# Patient Record
Sex: MALE | Race: WHITE | NOT HISPANIC OR LATINO | URBAN - METROPOLITAN AREA
[De-identification: names, ages, dates, MRNs, and addresses within clinical notes are randomized per-mention and may not be internally consistent; named-entity substitution may affect disease eponyms.]

---

## 2018-02-04 ENCOUNTER — EMERGENCY (EMERGENCY)
Facility: HOSPITAL | Age: 43
LOS: 1 days | Discharge: HOME | End: 2018-02-04
Attending: EMERGENCY MEDICINE | Admitting: EMERGENCY MEDICINE
Payer: COMMERCIAL

## 2018-02-04 VITALS
DIASTOLIC BLOOD PRESSURE: 90 MMHG | TEMPERATURE: 98 F | SYSTOLIC BLOOD PRESSURE: 130 MMHG | RESPIRATION RATE: 16 BRPM | HEART RATE: 88 BPM | OXYGEN SATURATION: 100 %

## 2018-02-04 DIAGNOSIS — N50.812 LEFT TESTICULAR PAIN: ICD-10-CM

## 2018-02-04 PROCEDURE — 76870 US EXAM SCROTUM: CPT | Mod: 26

## 2018-02-04 PROCEDURE — 99284 EMERGENCY DEPT VISIT MOD MDM: CPT

## 2018-02-04 RX ORDER — IBUPROFEN 200 MG
600 TABLET ORAL ONCE
Qty: 0 | Refills: 0 | Status: COMPLETED | OUTPATIENT
Start: 2018-02-04 | End: 2018-02-04

## 2018-02-04 RX ORDER — AZITHROMYCIN 500 MG/1
4 TABLET, FILM COATED ORAL
Qty: 4 | Refills: 0 | OUTPATIENT
Start: 2018-02-04 | End: 2018-02-04

## 2018-02-04 RX ADMIN — Medication 600 MILLIGRAM(S): at 15:51

## 2018-02-04 NOTE — ED PROVIDER NOTE - PROGRESS NOTE DETAILS
US completely normal. No evidence of epididymitis or torsion. Pt reports pain is improving. Will treat for possible early epididymitis with abx. Will instruct pt to f/u with urology for re-evaluation. Discussed small possibility of intermittent torsion so pt knows to return immediately if pain returns

## 2018-02-04 NOTE — ED ADULT TRIAGE NOTE - CHIEF COMPLAINT QUOTE
pt with left testicular pain for the last hour, denies trauma, states "It feels like someone kicked me in the balls"

## 2018-02-04 NOTE — ED PROVIDER NOTE - PHYSICAL EXAMINATION
VITAL SIGNS: I have reviewed nursing notes and confirm.  CONSTITUTIONAL: Well-developed; well-nourished; in no acute distress.  SKIN: Skin is warm and dry, no acute rash.  HEAD: Normocephalic; atraumatic.  EYES: PERRL, EOM intact; conjunctiva and sclera clear.  ENT: No nasal discharge; airway clear.  NECK: Supple; non tender.  CARD: S1, S2 normal; no murmurs, gallops, or rubs. Regular rate and rhythm.  RESP: No wheezes, rales or rhonchi.  ABD: Normal bowel sounds; soft; non-distended; non-tender; no hepatosplenomegaly.  : Tenderness to L testicle. Normal cremaster.  EXT: Normal ROM. No clubbing, cyanosis or edema.  NEURO: Alert, oriented. Grossly unremarkable.  PSYCH: Cooperative, appropriate. VITAL SIGNS: I have reviewed nursing notes and confirm.  CONSTITUTIONAL: Well-developed; well-nourished; in no acute distress.  SKIN: Skin is warm and dry, no acute rash.  HEAD: Normocephalic; atraumatic.  EYES: PERRL, EOM intact; conjunctiva and sclera clear.  ENT: No nasal discharge; airway clear.  NECK: Supple; non tender.  CARD: S1, S2 normal; no murmurs, gallops, or rubs. Regular rate and rhythm.  RESP: No wheezes, rales or rhonchi.  ABD: Normal bowel sounds; soft; non-distended; non-tender; no hepatosplenomegaly.  : Tenderness to L testicle. Normal cremaster.  No masses or hernias palpated.   EXT: Normal ROM. No clubbing, cyanosis or edema.  NEURO: Alert, oriented. Grossly unremarkable.  PSYCH: Cooperative, appropriate.

## 2018-02-04 NOTE — ED PROVIDER NOTE - OBJECTIVE STATEMENT
43 y/o M with pmhx HIV, on HAART, VL undetectable, follows up at [facility], coming in with acute onset L testicle pain which began around 11 AM. Pt reports he was sitting on his couch when he began to feel progressively worsening pain in L testicle. Pt attempted to alleviate pain by changing seating positions, urinating, and taking a hot bath with no relief. Denies dysuria, hematuria, difficulty urinary, fever, chills, n/v/d, abdominal pain, rash, lesions. Endorses h/o chlamydia. 43 y/o M with pmhx HIV, on HAART, VL undetectable, follows up at [facility], coming in with acute onset L testicle pain which began around 11 AM. Pt reports he was sitting on his couch when he began to feel progressively worsening pain in L testicle. Pt attempted to alleviate pain by changing seating positions, urinating, and taking a hot bath with no relief. He reports he may had "sat on it wrong" but not entirely sure.  Denies dysuria, hematuria, difficulty urinary, fever, chills, n/v/d, abdominal pain, rash, lesions. Endorses h/o chlamydia - treated.

## 2018-02-05 LAB
C TRACH RRNA SPEC QL NAA+PROBE: SIGNIFICANT CHANGE UP
N GONORRHOEA RRNA SPEC QL NAA+PROBE: SIGNIFICANT CHANGE UP
SPECIMEN SOURCE: SIGNIFICANT CHANGE UP

## 2021-01-19 PROBLEM — B00.9 HERPESVIRAL INFECTION, UNSPECIFIED: Chronic | Status: ACTIVE | Noted: 2018-02-05

## 2021-01-19 PROBLEM — B20 HUMAN IMMUNODEFICIENCY VIRUS [HIV] DISEASE: Chronic | Status: ACTIVE | Noted: 2018-02-05

## 2021-02-02 PROBLEM — Z00.00 ENCOUNTER FOR PREVENTIVE HEALTH EXAMINATION: Status: ACTIVE | Noted: 2021-02-02

## 2021-02-04 ENCOUNTER — OUTPATIENT (OUTPATIENT)
Dept: OUTPATIENT SERVICES | Facility: HOSPITAL | Age: 46
LOS: 1 days | End: 2021-02-04

## 2021-02-04 ENCOUNTER — APPOINTMENT (OUTPATIENT)
Dept: CT IMAGING | Facility: CLINIC | Age: 46
End: 2021-02-04
Payer: COMMERCIAL

## 2021-02-04 PROCEDURE — 74177 CT ABD & PELVIS W/CONTRAST: CPT | Mod: 26
